# Patient Record
Sex: FEMALE | Race: WHITE | NOT HISPANIC OR LATINO | URBAN - METROPOLITAN AREA
[De-identification: names, ages, dates, MRNs, and addresses within clinical notes are randomized per-mention and may not be internally consistent; named-entity substitution may affect disease eponyms.]

---

## 2017-09-05 ENCOUNTER — OFFICE (OUTPATIENT)
Dept: URBAN - METROPOLITAN AREA CLINIC 11 | Facility: CLINIC | Age: 57
End: 2017-09-05

## 2017-09-05 VITALS
HEART RATE: 70 BPM | DIASTOLIC BLOOD PRESSURE: 75 MMHG | WEIGHT: 170 LBS | HEIGHT: 66 IN | SYSTOLIC BLOOD PRESSURE: 119 MMHG

## 2017-09-05 DIAGNOSIS — R10.9 UNSPECIFIED ABDOMINAL PAIN: ICD-10-CM

## 2017-09-05 DIAGNOSIS — E66.3 OVERWEIGHT: ICD-10-CM

## 2017-09-05 DIAGNOSIS — R11.2 NAUSEA WITH VOMITING, UNSPECIFIED: ICD-10-CM

## 2017-09-05 LAB
CBC, PLATELET, NO DIFFERENTIAL: HEMATOCRIT: 41.2 % (ref 34–46.6)
CBC, PLATELET, NO DIFFERENTIAL: HEMOGLOBIN: 13.3 G/DL (ref 11.1–15.9)
CBC, PLATELET, NO DIFFERENTIAL: MCH: 29.5 PG (ref 26.6–33)
CBC, PLATELET, NO DIFFERENTIAL: MCHC: 32.3 G/DL (ref 31.5–35.7)
CBC, PLATELET, NO DIFFERENTIAL: MCV: 91 FL (ref 79–97)
CBC, PLATELET, NO DIFFERENTIAL: PLATELETS: 435 X10E3/UL — HIGH (ref 150–379)
CBC, PLATELET, NO DIFFERENTIAL: RBC: 4.51 X10E6/UL (ref 3.77–5.28)
CBC, PLATELET, NO DIFFERENTIAL: RDW: 13.8 % (ref 12.3–15.4)
CBC, PLATELET, NO DIFFERENTIAL: WBC: 5.4 X10E3/UL (ref 3.4–10.8)
COMP. METABOLIC PANEL (14): A/G RATIO: 1.4 (ref 1.2–2.2)
COMP. METABOLIC PANEL (14): ALBUMIN, SERUM: 4.3 G/DL (ref 3.5–5.5)
COMP. METABOLIC PANEL (14): ALKALINE PHOSPHATASE, S: 93 IU/L (ref 39–117)
COMP. METABOLIC PANEL (14): ALT (SGPT): 10 IU/L (ref 0–32)
COMP. METABOLIC PANEL (14): AST (SGOT): 13 IU/L (ref 0–40)
COMP. METABOLIC PANEL (14): BILIRUBIN, TOTAL: 0.2 MG/DL (ref 0–1.2)
COMP. METABOLIC PANEL (14): BUN/CREATININE RATIO: 16 (ref 9–23)
COMP. METABOLIC PANEL (14): BUN: 13 MG/DL (ref 6–24)
COMP. METABOLIC PANEL (14): CALCIUM, SERUM: 9.3 MG/DL (ref 8.7–10.2)
COMP. METABOLIC PANEL (14): CARBON DIOXIDE, TOTAL: 25 MMOL/L (ref 18–29)
COMP. METABOLIC PANEL (14): CHLORIDE, SERUM: 102 MMOL/L (ref 96–106)
COMP. METABOLIC PANEL (14): CREATININE, SERUM: 0.79 MG/DL (ref 0.57–1)
COMP. METABOLIC PANEL (14): EGFR IF AFRICN AM: 97 ML/MIN/1.73 (ref 59–?)
COMP. METABOLIC PANEL (14): EGFR IF NONAFRICN AM: 85 ML/MIN/1.73 (ref 59–?)
COMP. METABOLIC PANEL (14): GLOBULIN, TOTAL: 3 G/DL (ref 1.5–4.5)
COMP. METABOLIC PANEL (14): GLUCOSE, SERUM: 88 MG/DL (ref 65–99)
COMP. METABOLIC PANEL (14): POTASSIUM, SERUM: 5.1 MMOL/L (ref 3.5–5.2)
COMP. METABOLIC PANEL (14): PROTEIN, TOTAL, SERUM: 7.3 G/DL (ref 6–8.5)
COMP. METABOLIC PANEL (14): SODIUM, SERUM: 143 MMOL/L (ref 134–144)
THYROXINE (T4) FREE, DIRECT, S: T4,FREE(DIRECT): 1.15 NG/DL (ref 0.82–1.77)
TSH: 2.68 UIU/ML (ref 0.45–4.5)

## 2017-09-05 PROCEDURE — 99203 OFFICE O/P NEW LOW 30 MIN: CPT | Performed by: INTERNAL MEDICINE

## 2017-09-05 RX ORDER — SODIUM PICOSULFATE, MAGNESIUM OXIDE, AND ANHYDROUS CITRIC ACID 10; 3.5; 12 MG/16.1G; G/16.1G; G/16.1G
POWDER, METERED ORAL
Qty: 1 | Refills: 0 | Status: ACTIVE
Start: 2017-09-05

## 2025-01-06 ENCOUNTER — OFFICE (OUTPATIENT)
Dept: URBAN - METROPOLITAN AREA CLINIC 11 | Facility: CLINIC | Age: 65
End: 2025-01-06
Payer: COMMERCIAL

## 2025-01-06 VITALS
SYSTOLIC BLOOD PRESSURE: 148 MMHG | OXYGEN SATURATION: 98 % | HEART RATE: 69 BPM | HEIGHT: 66 IN | SYSTOLIC BLOOD PRESSURE: 150 MMHG | DIASTOLIC BLOOD PRESSURE: 90 MMHG | DIASTOLIC BLOOD PRESSURE: 88 MMHG | WEIGHT: 191 LBS

## 2025-01-06 DIAGNOSIS — K21.9 GASTRO-ESOPHAGEAL REFLUX DISEASE WITHOUT ESOPHAGITIS: ICD-10-CM

## 2025-01-06 DIAGNOSIS — R13.10 DYSPHAGIA, UNSPECIFIED: ICD-10-CM

## 2025-01-06 DIAGNOSIS — K44.9 DIAPHRAGMATIC HERNIA WITHOUT OBSTRUCTION OR GANGRENE: ICD-10-CM

## 2025-01-06 PROCEDURE — 99204 OFFICE O/P NEW MOD 45 MIN: CPT | Performed by: NURSE PRACTITIONER

## 2025-01-06 RX ORDER — SODIUM PICOSULFATE, MAGNESIUM OXIDE, AND ANHYDROUS CITRIC ACID 12; 3.5; 1 G/175ML; G/175ML; MG/175ML
LIQUID ORAL
Qty: 1 | Refills: 0 | Status: ACTIVE
Start: 2025-01-06

## 2025-01-06 NOTE — SERVICEHPINOTES
Ms. Rendon is a 64 year old female here for a hiatal hernia. She initially presented as a consultation from Dr. House for recent abdominal pain in 2017.  She says several weeks ago she developed left lower quadrant abdominal pain.  She described the pain as sharp and mostly being in the left lower quadrant although it would sometimes radiate to her back.  she described associated bloating as well as some abdominal cramping that would be relieved with a bowel movement.  She also describes associated nausea with 1 episode of vomiting.  The symptoms appear to be worse after eating.  She also endorses longstanding constipation since childhood.  She says the child she had fairly severe  constipation however as an adult for the last several decades she has had essentially 1 bowel movement every 3 days.  She describes her stools as formed.    She uses occasional glycerine suppositories  if it has been several days without a bowel movement. During that time for she was having the aforementioned abdominal pain she said she was having a bowel movement once per day which was abnormal for her.   She says the stools were formed and she denies any diarrhea.  She denies any signs of GI bleeding.  She says she has gained a little weight in the past few months.  She initially presented her OBGYN where they performed a workup including a CT and pelvic ultrasound which she said was all normal and she was referred here for concerns of her abdominal pain and possible diverticular disease. She has never had a colonoscopy.  
saran noonan On 01/06/2025,  she has not been seen since her first visit in September 2017.   She was having chest pain and went to [facility] in November 2024. She was found to have a large hiatal hernia on CT angiogram.  She has had a negative cardiac workup.   She states she has had issues with me, bulky foods getting hung up in her chest when she swallows for the last 5 years.  Her symptoms have progressively gotten worse over time.  She has been having heartburn, reflux for last few months and started taking OTC Nexium 20 mg daily 2 months ago. The OTC Nexium is controlling her symptoms well.    She denies any nausea, vomiting, abdominal pain.  She has regular bowel movements and diet any change in bowel habits or blood in stool.  She has never had a colonoscopy.

## 2025-01-06 NOTE — SERVICENOTES
she will continue on OTC Nexium for her GERD.  Will get EGD to further evaluate dysphagia.  If she was found have a large hiatal hernia.  Discussed that if her GERD symptoms were controlled on OTC Nexium then I would hold off on a referral to surgery at this time.  She agreed.   We will get her scheduled for screening colonoscopy as well.  Will see back in 3 months or sooner if needed

## 2025-02-26 ENCOUNTER — OFFICE (OUTPATIENT)
Dept: URBAN - METROPOLITAN AREA PATHOLOGY 12 | Facility: PATHOLOGY | Age: 65
End: 2025-02-26
Payer: COMMERCIAL

## 2025-02-26 ENCOUNTER — AMBULATORY SURGICAL CENTER (OUTPATIENT)
Dept: URBAN - METROPOLITAN AREA SURGERY 2 | Facility: SURGERY | Age: 65
End: 2025-02-26
Payer: COMMERCIAL

## 2025-02-26 VITALS
SYSTOLIC BLOOD PRESSURE: 121 MMHG | DIASTOLIC BLOOD PRESSURE: 74 MMHG | OXYGEN SATURATION: 98 % | DIASTOLIC BLOOD PRESSURE: 70 MMHG | TEMPERATURE: 98.9 F | WEIGHT: 188 LBS | OXYGEN SATURATION: 95 % | SYSTOLIC BLOOD PRESSURE: 134 MMHG | WEIGHT: 188 LBS | OXYGEN SATURATION: 96 % | SYSTOLIC BLOOD PRESSURE: 134 MMHG | HEART RATE: 67 BPM | OXYGEN SATURATION: 95 % | HEIGHT: 66 IN | DIASTOLIC BLOOD PRESSURE: 82 MMHG | HEART RATE: 71 BPM | OXYGEN SATURATION: 94 % | HEART RATE: 67 BPM | RESPIRATION RATE: 16 BRPM | SYSTOLIC BLOOD PRESSURE: 115 MMHG | HEIGHT: 66 IN | OXYGEN SATURATION: 97 % | SYSTOLIC BLOOD PRESSURE: 115 MMHG | HEART RATE: 70 BPM | HEART RATE: 68 BPM | TEMPERATURE: 98.9 F | SYSTOLIC BLOOD PRESSURE: 121 MMHG | OXYGEN SATURATION: 97 % | RESPIRATION RATE: 16 BRPM | DIASTOLIC BLOOD PRESSURE: 70 MMHG | SYSTOLIC BLOOD PRESSURE: 122 MMHG | HEART RATE: 68 BPM | OXYGEN SATURATION: 96 % | DIASTOLIC BLOOD PRESSURE: 72 MMHG | HEART RATE: 70 BPM | HEART RATE: 81 BPM | OXYGEN SATURATION: 98 % | DIASTOLIC BLOOD PRESSURE: 74 MMHG | DIASTOLIC BLOOD PRESSURE: 82 MMHG | DIASTOLIC BLOOD PRESSURE: 72 MMHG | SYSTOLIC BLOOD PRESSURE: 122 MMHG | HEART RATE: 71 BPM | HEART RATE: 81 BPM | OXYGEN SATURATION: 94 %

## 2025-02-26 DIAGNOSIS — K57.30 DIVERTICULOSIS OF LARGE INTESTINE WITHOUT PERFORATION OR ABS: ICD-10-CM

## 2025-02-26 DIAGNOSIS — Z12.11 ENCOUNTER FOR SCREENING FOR MALIGNANT NEOPLASM OF COLON: ICD-10-CM

## 2025-02-26 DIAGNOSIS — K29.50 UNSPECIFIED CHRONIC GASTRITIS WITHOUT BLEEDING: ICD-10-CM

## 2025-02-26 DIAGNOSIS — R13.10 DYSPHAGIA, UNSPECIFIED: ICD-10-CM

## 2025-02-26 DIAGNOSIS — K31.89 OTHER DISEASES OF STOMACH AND DUODENUM: ICD-10-CM

## 2025-02-26 DIAGNOSIS — K21.9 GASTRO-ESOPHAGEAL REFLUX DISEASE WITHOUT ESOPHAGITIS: ICD-10-CM

## 2025-02-26 DIAGNOSIS — K63.5 POLYP OF COLON: ICD-10-CM

## 2025-02-26 DIAGNOSIS — K22.5 DIVERTICULUM OF ESOPHAGUS, ACQUIRED: ICD-10-CM

## 2025-02-26 DIAGNOSIS — K44.9 DIAPHRAGMATIC HERNIA WITHOUT OBSTRUCTION OR GANGRENE: ICD-10-CM

## 2025-02-26 PROBLEM — K29.70 GASTRITIS, UNSPECIFIED, WITHOUT BLEEDING: Status: ACTIVE | Noted: 2025-02-26

## 2025-02-26 PROCEDURE — 88305 TISSUE EXAM BY PATHOLOGIST: CPT | Performed by: PATHOLOGY

## 2025-02-26 PROCEDURE — 43239 EGD BIOPSY SINGLE/MULTIPLE: CPT | Mod: 51 | Performed by: INTERNAL MEDICINE

## 2025-02-26 PROCEDURE — 45380 COLONOSCOPY AND BIOPSY: CPT | Mod: 33 | Performed by: INTERNAL MEDICINE

## 2025-02-27 LAB
GASTRO ONE PATHOLOGY: PDF REPORT: (no result)
GASTRO ONE PATHOLOGY: PDF REPORT: (no result)

## 2025-03-25 PROBLEM — K44.9 DIAPHRAGMATIC HERNIA WITHOUT OBSTRUCTION OR GANGRENE: Status: ACTIVE | Noted: 2025-02-26

## 2025-04-01 ENCOUNTER — OFFICE (OUTPATIENT)
Dept: URBAN - METROPOLITAN AREA CLINIC 11 | Facility: CLINIC | Age: 65
End: 2025-04-01

## 2025-04-01 VITALS
HEART RATE: 73 BPM | WEIGHT: 196 LBS | DIASTOLIC BLOOD PRESSURE: 78 MMHG | SYSTOLIC BLOOD PRESSURE: 142 MMHG | DIASTOLIC BLOOD PRESSURE: 76 MMHG | HEIGHT: 66 IN | OXYGEN SATURATION: 95 % | SYSTOLIC BLOOD PRESSURE: 133 MMHG

## 2025-04-01 DIAGNOSIS — K44.9 DIAPHRAGMATIC HERNIA WITHOUT OBSTRUCTION OR GANGRENE: ICD-10-CM

## 2025-04-01 DIAGNOSIS — R13.10 DYSPHAGIA, UNSPECIFIED: ICD-10-CM

## 2025-04-01 DIAGNOSIS — K21.9 GASTRO-ESOPHAGEAL REFLUX DISEASE WITHOUT ESOPHAGITIS: ICD-10-CM

## 2025-04-01 PROCEDURE — 99213 OFFICE O/P EST LOW 20 MIN: CPT | Performed by: NURSE PRACTITIONER

## 2025-04-01 NOTE — SERVICEHPINOTES
Ms. Rendon is a 64 year old female here for a hiatal hernia. She initially presented as a consultation from Dr. House for recent abdominal pain in 2017.  She says several weeks ago she developed left lower quadrant abdominal pain.  She described the pain as sharp and mostly being in the left lower quadrant although it would sometimes radiate to her back.  she described associated bloating as well as some abdominal cramping that would be relieved with a bowel movement.  She also describes associated nausea with 1 episode of vomiting.  The symptoms appear to be worse after eating.  She also endorses longstanding constipation since childhood.  She says the child she had fairly severe  constipation however as an adult for the last several decades she has had essentially 1 bowel movement every 3 days.  She describes her stools as formed.    She uses occasional glycerine suppositories  if it has been several days without a bowel movement. During that time for she was having the aforementioned abdominal pain she said she was having a bowel movement once per day which was abnormal for her.   She says the stools were formed and she denies any diarrhea.  She denies any signs of GI bleeding.  She says she has gained a little weight in the past few months.  She initially presented her OBGYN where they performed a workup including a CT and pelvic ultrasound which she said was all normal and she was referred here for concerns of her abdominal pain and possible diverticular disease. She has never had a colonoscopy. On 01/06/2025,  she has not been seen since her first visit in September 2017.   She was having chest pain and went to [facility] in November 2024. She was found to have a large hiatal hernia on CT angiogram.  She has had a negative cardiac workup.   She states she has had issues with me, bulky foods getting hung up in her chest when she swallows for the last 5 years.  Her symptoms have progressively gotten worse over time.  She has been having heartburn, reflux for last few months and started taking OTC Nexium 20 mg daily 2 months ago. The OTC Nexium is controlling her symptoms well.    She denies any nausea, vomiting, abdominal pain.  She has regular bowel movements and diet any change in bowel habits or blood in stool.  She has never had a colonoscopy.  
br
saran  In follow-up on 04/01/2025,  she had an EGD on 02/26/2025 that showed gastritis, diverticulum in the middle 3rd of the esophagus, angulation of the distal esophagus  proximal to the GE junction. No stricture noted.  Thought to be mass effect from hiatal hernia.   Pathology was benign.   She was referred to Dr. Godfrey for hiatal hernia and has a consultation with him next month.   She continues on OTC Nexium 20 mg for her GERD and is doing fairly well.  She continues to have dysphagia symptoms when she eats bulkier foods such as meat and takes too big bites of food.   She is making sure she takes small bites of food and chewing her food thoroughly and this has been helping.   She denies any nausea, vomiting or abdominal pain.

## 2025-04-01 NOTE — SERVICENOTES
she will continue OTC Nexium for GERD.  She will continue to take smaller bites of food to help  minimize dysphagia symptoms she has an appointment next month with Dr. Godfrey regarding her hiatal hernia.   She is not sure she wants to proceed with surgery at this time but would like to know what her options are.  We will see back in 6 months or sooner if needed